# Patient Record
Sex: FEMALE | Race: AMERICAN INDIAN OR ALASKA NATIVE
[De-identification: names, ages, dates, MRNs, and addresses within clinical notes are randomized per-mention and may not be internally consistent; named-entity substitution may affect disease eponyms.]

---

## 2021-01-23 ENCOUNTER — HOSPITAL ENCOUNTER (EMERGENCY)
Dept: HOSPITAL 5 - ED | Age: 26
Discharge: HOME | End: 2021-01-23
Payer: SELF-PAY

## 2021-01-23 VITALS — DIASTOLIC BLOOD PRESSURE: 94 MMHG | SYSTOLIC BLOOD PRESSURE: 155 MMHG

## 2021-01-23 DIAGNOSIS — E11.9: ICD-10-CM

## 2021-01-23 DIAGNOSIS — Z98.890: ICD-10-CM

## 2021-01-23 DIAGNOSIS — G51.0: Primary | ICD-10-CM

## 2021-01-23 DIAGNOSIS — Z79.899: ICD-10-CM

## 2021-01-23 DIAGNOSIS — R51.9: ICD-10-CM

## 2021-01-23 DIAGNOSIS — E66.9: ICD-10-CM

## 2021-01-23 PROCEDURE — 70450 CT HEAD/BRAIN W/O DYE: CPT

## 2021-01-23 NOTE — CAT SCAN REPORT
CT HEAD WITHOUT CONTRAST



INDICATION / CLINICAL INFORMATION: headache.



TECHNIQUE: All CT scans at this location are performed using CT dose reduction for ALARA by means of 
automated exposure control. 



COMPARISON: None available.



FINDINGS:

HEMORRHAGE: None.

EXTRA-AXIAL SPACES: Normal in size and morphology for the patient's age.

VENTRICULAR SYSTEM: Normal in size and morphology for the patient's age.

CEREBRAL PARENCHYMA: No significant abnormality. No acute territorial infarct. 

MIDLINE SHIFT / HERNIATION: None.

CEREBELLUM / BRAINSTEM: No significant abnormality.



ORBITS: Normal as visualized.

SOFT TISSUES: No significant abnormality.

SKULL: No significant abnormality.

PARANASAL SINUSES / MASTOID AIR CELLS: Normal as visualized.



ADDITIONAL FINDINGS: None.



IMPRESSION:

1. No acute intracranial abnormality.



Signer Name: Dereck Villafana MD 

Signed: 1/23/2021 3:07 AM

Workstation Name: [id number]

## 2021-01-23 NOTE — EMERGENCY DEPARTMENT REPORT
ED General Adult HPI





- General


Chief complaint: Neuro Symptoms/Deficit


Stated complaint: HEAD PAIN


Time Seen by Provider: 01/23/21 01:12


Source: patient


Mode of arrival: Ambulatory


Limitations: No Limitations





- History of Present Illness


Initial comments: 





Patient is a 25-year-old female presents emergency room complaints of a right 

posterior occipital headache that began 6 days ago.  She states that yesterday 

she began feeling like the right side of her face felt weird and her tongue.  

She states that it feels funny when she smiles and she cannot completely close 

her eye.  She denies any vision changes, nausea, vomiting, diarrhea, fever, 

facial numbness, numbness in the bilateral upper extremities or lower 

extremities, unilateral weakness, speech disturbance, gait disturbance, any 

other symptoms.  No past medical history.  No allergies to medications.  Last 

menstrual cycle 1/19/2021.





- Related Data


                                  Previous Rx's











 Medication  Instructions  Recorded  Last Taken  Type


 


Butalb/Acetaminophen/Caffeine 1 cap PO Q8HR PRN #10 cap 01/23/21 Unknown Rx





[Fioricet -40 mg CAP]    


 


Valacyclovir HCl [Valacyclovir] 1,000 mg PO TID 7 Days #21 tablet 01/23/21 

Unknown Rx


 


predniSONE [Deltasone] 60 mg PO QDAY 7 Days #21 tab 01/23/21 Unknown Rx











                                    Allergies











Allergy/AdvReac Type Severity Reaction Status Date / Time


 


No Known Allergies Allergy   Unverified 01/23/21 01:22














ED Review of Systems


ROS: 


Stated complaint: HEAD PAIN


Other details as noted in HPI





Comment: All other systems reviewed and negative





ED Past Medical Hx





- Past Medical History


Previous Medical History?: Yes


Hx Diabetes: Yes


Additional medical history: Obesity





- Surgical History


Past Surgical History?: Yes


Additional Surgical History: Oral





- Social History


Smoking Status: Never Smoker


Substance Use Type: None





- Medications


Home Medications: 


                                Home Medications











 Medication  Instructions  Recorded  Confirmed  Last Taken  Type


 


Butalb/Acetaminophen/Caffeine 1 cap PO Q8HR PRN #10 cap 01/23/21  Unknown Rx





[Fioricet -40 mg CAP]     


 


Valacyclovir HCl [Valacyclovir] 1,000 mg PO TID 7 Days #21 tablet 01/23/21  

Unknown Rx


 


predniSONE [Deltasone] 60 mg PO QDAY 7 Days #21 tab 01/23/21  Unknown Rx














ED Physical Exam





- General


Limitations: No Limitations


General appearance: alert, in no apparent distress





- Head


Head exam: Present: atraumatic, normocephalic





- Eye


Eye exam: Present: normal appearance, PERRL, EOMI.  Absent: periorbital 

swelling, periorbital tenderness





- ENT


ENT exam: Present: mucous membranes moist





- Respiratory


Respiratory exam: Present: normal lung sounds bilaterally.  Absent: respiratory 

distress, wheezes, rales, rhonchi, stridor, chest wall tenderness, accessory 

muscle use, decreased breath sounds, prolonged expiratory





- Cardiovascular


Cardiovascular Exam: Present: regular rate, normal rhythm, normal heart sounds. 

Absent: systolic murmur, diastolic murmur, rubs, gallop





- Neurological Exam


Neurological exam: Present: alert, oriented X3, normal gait, other (very slight 

right sided facial droop, slight decreased in closing of right eye, slight 

decrease in raising the right eyebrow, sensation intact throughout, no facial 

numbness, tongue is midline, 5/5 muscle strength in the BUE/BLE, no pronator 

drift, normal finger to nose, normal heel to shin)





- Psychiatric


Psychiatric exam: Present: normal affect, normal mood





- Skin


Skin exam: Present: warm, dry, intact





ED Course


                                   Vital Signs











  01/23/21





  00:56


 


Temperature 98.1 F


 


Pulse Rate 70


 


Respiratory 16





Rate 


 


Blood Pressure 155/94


 


O2 Sat by Pulse 96





Oximetry 














ED Medical Decision Making





- Radiology Data


Radiology results: report reviewed





Ordering Physician: JASON KC 


 Date of Service: 01/23/21 


 Procedure(s): CT head/brain wo con 


 Accession Number(s): [id number] 





 cc: JASON KC 








 CT HEAD WITHOUT CONTRAST 





INDICATION / CLINICAL INFORMATION: headache. 





TECHNIQUE: All CT scans at this location are performed using CT dose reduction 

for ALARA by means 


 of automated exposure control. 





COMPARISON: None available. 





FINDINGS: 


HEMORRHAGE: None. 


EXTRA-AXIAL SPACES: Normal in size and morphology for the patient's age. 


VENTRICULAR SYSTEM: Normal in size and morphology for the patient's age. 


CEREBRAL PARENCHYMA: No significant abnormality. No acute territorial infarct. 


MIDLINE SHIFT / HERNIATION: None. 


CEREBELLUM / BRAINSTEM: No significant abnormality. 





ORBITS: Normal as visualized. 


SOFT TISSUES: No significant abnormality. 


SKULL: No significant abnormality. 


PARANASAL SINUSES / MASTOID AIR CELLS: Normal as visualized. 





ADDITIONAL FINDINGS: None. 





IMPRESSION: 


1. No acute intracranial abnormality. 





Signer Name: Dereck Villafana MD 


Signed: 1/23/2021 3:07 AM 


Workstation Name: [id number] 








 Transcribed By: SS 


 Dictated By: Dereck Villafana MD 


 Electronically Authenticated By: Dereck Villafana MD 


 Signed Date/Time: 01/23/21 0307 











 DD/DT: 01/23/21 0304 


 TD/TT: 





- Medical Decision Making





Patient is a 25-year-old female presents emergency room complaints of a right 

posterior occipital headache that began 6 days ago.  She states that yesterday 

she began feeling like the right side of her face felt weird and her tongue.  

She states that it feels funny when she smiles and she cannot completely close 

her eye.  She denies any vision changes, nausea, vomiting, diarrhea, fever, 

facial numbness, numbness in the bilateral upper extremities or lower 

extremities, unilateral weakness, speech disturbance, gait disturbance, any 

other symptoms.  No past medical history.  No allergies to medications.  Last 

menstrual cycle 1/19/2021. VSS. on exam:very slight right sided facial droop, 

slight decreased in closing of right eye, slight decrease in raising the right 

eyebrow, sensation intact throughout, no facial numbness, tongue is midline, 5/5

muscle strength in the BUE/BLE, no pronator drift, normal finger to nose, normal

heel to shin.  Examination appears consistent with possible early Bell's palsy. 

CT head 1. No acute intracranial abnormality.  Patient given prescription for 

valacyclovir, prednisone, Fioricet.  Patient will be given neurology follow-up. 

Discussed return precautions with patient.  Advised patient Please take 

medication as prescribed.  Follow-up with your primary care doctor.  Follow-up 

with a neurologist.  Please use artificial tears frequently in the eye.  Return 

to emergency room for new or worsening symptoms.





Critical care attestation.: 


If time is entered above; I have spent that time in minutes in the direct care 

of this critically ill patient, excluding procedure time.








ED Disposition


Clinical Impression: 


 Bell's palsy





Headache


Qualifiers:


 Headache type: unspecified Headache chronicity pattern: acute headache 

Intractability: not intractable Qualified Code(s): R51.9 - Headache, unspecified





Disposition: DC-01 TO HOME OR SELFCARE


Is pt being admited?: No


Does the pt Need Aspirin: No


Condition: Stable


Instructions:  Wagner Palsy, Adult


Additional Instructions: 


Please take medication as prescribed.  Follow-up with your primary care doctor. 

Follow-up with a neurologist.  Please use artificial tears frequently in the 

eye.  Return to emergency room for new or worsening symptoms.








The CT scan of your head is normal


Prescriptions: 


predniSONE [Deltasone] 60 mg PO QDAY 7 Days #21 tab


Butalb/Acetaminophen/Caffeine [Fioricet -40 mg CAP] 1 cap PO Q8HR PRN #10 

cap


 PRN Reason: headache


Valacyclovir HCl [Valacyclovir] 1,000 mg PO TID 7 Days #21 tablet


Referrals: 


PRIMARY CARE,MD [Primary Care Provider] - 2-3 Days


CHRISTA SURESH MD [Referring] - 2-3 Days


Time of Disposition: 03:16


Print Language: ENGLISH